# Patient Record
Sex: FEMALE | Race: OTHER | HISPANIC OR LATINO | URBAN - METROPOLITAN AREA
[De-identification: names, ages, dates, MRNs, and addresses within clinical notes are randomized per-mention and may not be internally consistent; named-entity substitution may affect disease eponyms.]

---

## 2020-07-08 ENCOUNTER — OFFICE VISIT (OUTPATIENT)
Dept: URBAN - METROPOLITAN AREA CLINIC 60 | Facility: CLINIC | Age: 65
End: 2020-07-08

## 2020-07-08 ENCOUNTER — OFFICE VISIT (OUTPATIENT)
Dept: URBAN - METROPOLITAN AREA TELEHEALTH 2 | Facility: TELEHEALTH | Age: 65
End: 2020-07-08

## 2020-09-14 ENCOUNTER — OFFICE VISIT (OUTPATIENT)
Dept: URBAN - METROPOLITAN AREA CLINIC 63 | Facility: CLINIC | Age: 65
End: 2020-09-14

## 2020-09-15 ENCOUNTER — OFFICE VISIT (OUTPATIENT)
Dept: URBAN - METROPOLITAN AREA TELEHEALTH 2 | Facility: TELEHEALTH | Age: 65
End: 2020-09-15

## 2020-10-14 ENCOUNTER — OFFICE VISIT (OUTPATIENT)
Dept: URBAN - METROPOLITAN AREA CLINIC 121 | Facility: CLINIC | Age: 65
End: 2020-10-14

## 2020-10-20 ENCOUNTER — OFFICE VISIT (OUTPATIENT)
Dept: URBAN - METROPOLITAN AREA CLINIC 60 | Facility: CLINIC | Age: 65
End: 2020-10-20

## 2020-10-29 ENCOUNTER — OFFICE VISIT (OUTPATIENT)
Dept: URBAN - METROPOLITAN AREA CLINIC 60 | Facility: CLINIC | Age: 65
End: 2020-10-29

## 2021-01-22 ENCOUNTER — OFFICE VISIT (OUTPATIENT)
Dept: URBAN - METROPOLITAN AREA SURGERY CENTER 4 | Facility: SURGERY CENTER | Age: 66
End: 2021-01-22

## 2021-01-26 LAB — PATHOLOGY (INDENTED REPORT): (no result)

## 2021-03-24 ENCOUNTER — OFFICE VISIT (OUTPATIENT)
Dept: URBAN - METROPOLITAN AREA CLINIC 63 | Facility: CLINIC | Age: 66
End: 2021-03-24

## 2021-06-11 ENCOUNTER — IMPORTED ENCOUNTER (OUTPATIENT)
Dept: URBAN - METROPOLITAN AREA CLINIC 31 | Facility: CLINIC | Age: 66
End: 2021-06-11

## 2021-06-11 PROCEDURE — 92004 COMPRE OPH EXAM NEW PT 1/>: CPT

## 2021-06-11 PROCEDURE — 92015 DETERMINE REFRACTIVE STATE: CPT

## 2021-12-02 ENCOUNTER — OFFICE VISIT (OUTPATIENT)
Dept: URBAN - METROPOLITAN AREA CLINIC 121 | Facility: CLINIC | Age: 66
End: 2021-12-02

## 2022-02-03 ENCOUNTER — OFFICE VISIT (OUTPATIENT)
Dept: URBAN - METROPOLITAN AREA SURGERY CENTER 4 | Facility: SURGERY CENTER | Age: 67
End: 2022-02-03

## 2022-02-07 LAB — PATHOLOGY (INDENTED REPORT): (no result)

## 2022-03-30 ENCOUNTER — OFFICE VISIT (OUTPATIENT)
Dept: URBAN - METROPOLITAN AREA CLINIC 63 | Facility: CLINIC | Age: 67
End: 2022-03-30

## 2022-04-01 ASSESSMENT — VISUAL ACUITY
OD_CC: 20/30-1
OS_CC: 20/25+1
OS_SC: J3
OD_SC: J10

## 2022-04-01 ASSESSMENT — TONOMETRY
OD_IOP_MMHG: 14
OS_IOP_MMHG: 15

## 2022-06-22 ENCOUNTER — OFFICE VISIT (OUTPATIENT)
Dept: URBAN - METROPOLITAN AREA CLINIC 63 | Facility: CLINIC | Age: 67
End: 2022-06-22

## 2022-07-09 ENCOUNTER — TELEPHONE ENCOUNTER (OUTPATIENT)
Dept: URBAN - METROPOLITAN AREA CLINIC 121 | Facility: CLINIC | Age: 67
End: 2022-07-09

## 2022-07-09 RX ORDER — ATORVASTATIN CALCIUM 20 MG/1
TABLET, FILM COATED ORAL
Refills: 0 | OUTPATIENT
Start: 2020-03-04 | End: 2022-03-30

## 2022-07-09 RX ORDER — METFORMIN HCL 500 MG/1
TABLET ORAL TWICE A DAY
Refills: 0 | OUTPATIENT
Start: 2020-03-04 | End: 2022-03-30

## 2022-07-09 RX ORDER — BENAZEPRIL HYDROCHLORIDE 5 MG/1
TABLET ORAL
Refills: 0 | OUTPATIENT
Start: 2020-03-04 | End: 2020-03-04

## 2022-07-09 RX ORDER — PANTOPRAZOLE SODIUM 40 MG/1
TABLET, DELAYED RELEASE ORAL
Refills: 0 | OUTPATIENT
Start: 2020-03-04 | End: 2020-09-14

## 2022-07-09 RX ORDER — SUCRALFATE 1 G/1
TWICE A DAY TABLET ORAL TWICE A DAY
Refills: 1 | OUTPATIENT
Start: 2020-07-08 | End: 2022-06-22

## 2022-07-09 RX ORDER — SUCRALFATE 1 G/1
TWICE A DAY TABLET ORAL TWICE A DAY
Refills: 0 | OUTPATIENT
Start: 2022-03-30 | End: 2022-03-31

## 2022-07-09 RX ORDER — SUCRALFATE 1 G/1
TWICE A DAY TABLET ORAL TWICE A DAY
Refills: 0 | OUTPATIENT
Start: 2022-03-31 | End: 2022-04-06

## 2022-07-09 RX ORDER — BISMUTH SUBSALICYLATE 525 MG/1
ONCE A DAY TABLET ORAL ONCE A DAY
Refills: 1 | OUTPATIENT
Start: 2020-03-04 | End: 2020-10-20

## 2022-07-10 ENCOUNTER — TELEPHONE ENCOUNTER (OUTPATIENT)
Dept: URBAN - METROPOLITAN AREA CLINIC 121 | Facility: CLINIC | Age: 67
End: 2022-07-10

## 2022-07-10 RX ORDER — BENAZEPRIL HYDROCHLORIDE 5 MG/1
1/2 TAB DAILY TABLET ORAL
Refills: 0 | Status: ACTIVE | COMMUNITY
Start: 2020-03-04

## 2022-07-10 RX ORDER — ICOSAPENT ETHYL 1000 MG/1
CAPSULE ORAL TWICE A DAY
Refills: 0 | Status: ACTIVE | COMMUNITY
Start: 2020-03-04

## 2022-07-10 RX ORDER — OMEPRAZOLE 40 MG/1
ONCE A DAY CAPSULE, DELAYED RELEASE ORAL ONCE A DAY
Refills: 2 | Status: ACTIVE | COMMUNITY
Start: 2022-03-30

## 2022-07-10 RX ORDER — ROSUVASTATIN CALCIUM 20 MG/1
TABLET, FILM COATED ORAL ONCE A DAY
Refills: 0 | Status: ACTIVE | COMMUNITY
Start: 2022-03-30

## 2022-07-10 RX ORDER — METRONIDAZOLE 500 MG/1
THREE TIMES A DAY TABLET ORAL THREE TIMES A DAY
Refills: 0 | Status: ACTIVE | COMMUNITY
Start: 2020-10-20

## 2022-07-10 RX ORDER — SUCRALFATE 1 G/1
TWICE A DAY TABLET ORAL TWICE A DAY
Refills: 0 | Status: ACTIVE | COMMUNITY
Start: 2022-04-06

## 2022-07-10 RX ORDER — CIPROFLOXACIN HYDROCHLORIDE 500 MG/1
TWICE A DAY TABLET, FILM COATED ORAL TWICE A DAY
Refills: 0 | Status: ACTIVE | COMMUNITY
Start: 2020-10-20

## 2022-07-10 RX ORDER — DENOSUMAB 60 MG/ML
ONCE EVERY 6 MONTH INJECTION SUBCUTANEOUS
Refills: 0 | Status: ACTIVE | COMMUNITY
Start: 2020-03-04

## 2022-07-10 RX ORDER — LEVOTHYROXINE SODIUM 25 UG/1
TABLET ORAL ONCE A DAY
Refills: 0 | Status: ACTIVE | COMMUNITY
Start: 2022-03-30

## 2022-09-14 ENCOUNTER — OFFICE VISIT (OUTPATIENT)
Dept: URBAN - METROPOLITAN AREA CLINIC 63 | Facility: CLINIC | Age: 67
End: 2022-09-14
Payer: COMMERCIAL

## 2022-09-14 ENCOUNTER — DASHBOARD ENCOUNTERS (OUTPATIENT)
Age: 67
End: 2022-09-14

## 2022-09-14 ENCOUNTER — WEB ENCOUNTER (OUTPATIENT)
Dept: URBAN - METROPOLITAN AREA CLINIC 63 | Facility: CLINIC | Age: 67
End: 2022-09-14

## 2022-09-14 ENCOUNTER — OFFICE VISIT (OUTPATIENT)
Dept: URBAN - METROPOLITAN AREA CLINIC 63 | Facility: CLINIC | Age: 67
End: 2022-09-14

## 2022-09-14 VITALS
WEIGHT: 118.4 LBS | DIASTOLIC BLOOD PRESSURE: 75 MMHG | SYSTOLIC BLOOD PRESSURE: 125 MMHG | BODY MASS INDEX: 23.25 KG/M2 | HEIGHT: 60 IN | OXYGEN SATURATION: 99 % | HEART RATE: 92 BPM | TEMPERATURE: 97.2 F

## 2022-09-14 DIAGNOSIS — K22.9 DISEASE OF ESOPHAGUS, UNSPECIFIED: ICD-10-CM

## 2022-09-14 DIAGNOSIS — M81.0: ICD-10-CM

## 2022-09-14 DIAGNOSIS — K57.32 DVTRCLI OF LG INT W/O PERFORATION OR ABSCESS W/O BLEEDING: ICD-10-CM

## 2022-09-14 PROCEDURE — 99213 OFFICE O/P EST LOW 20 MIN: CPT | Performed by: INTERNAL MEDICINE

## 2022-09-14 RX ORDER — OMEPRAZOLE 40 MG/1
ONCE A DAY CAPSULE, DELAYED RELEASE ORAL ONCE A DAY
Refills: 2 | Status: ACTIVE | COMMUNITY
Start: 2022-03-30

## 2022-09-14 RX ORDER — DENOSUMAB 60 MG/ML
ONCE EVERY 6 MONTH INJECTION SUBCUTANEOUS
Refills: 0 | Status: ACTIVE | COMMUNITY
Start: 2020-03-04

## 2022-09-14 RX ORDER — ICOSAPENT ETHYL 1000 MG/1
CAPSULE ORAL TWICE A DAY
Refills: 0 | Status: ACTIVE | COMMUNITY
Start: 2020-03-04

## 2022-09-14 RX ORDER — BENAZEPRIL HYDROCHLORIDE 5 MG/1
1/2 TAB DAILY TABLET ORAL
Refills: 0 | Status: ACTIVE | COMMUNITY
Start: 2020-03-04

## 2022-09-14 RX ORDER — OMEPRAZOLE 20 MG/1
1 CAPSULE 30 MINUTES BEFORE MORNING MEAL CAPSULE, DELAYED RELEASE ORAL ONCE A DAY
Qty: 30 | Refills: 1 | OUTPATIENT

## 2022-09-14 RX ORDER — LEVOTHYROXINE SODIUM 25 UG/1
TABLET ORAL ONCE A DAY
Refills: 0 | Status: ACTIVE | COMMUNITY
Start: 2022-03-30

## 2022-09-14 RX ORDER — ROSUVASTATIN CALCIUM 20 MG/1
TABLET, FILM COATED ORAL ONCE A DAY
Refills: 0 | Status: ACTIVE | COMMUNITY
Start: 2022-03-30

## 2024-05-02 ENCOUNTER — TELEPHONE ENCOUNTER (OUTPATIENT)
Dept: URBAN - METROPOLITAN AREA CLINIC 63 | Facility: CLINIC | Age: 69
End: 2024-05-02

## 2024-05-02 ENCOUNTER — LAB OUTSIDE AN ENCOUNTER (OUTPATIENT)
Dept: URBAN - METROPOLITAN AREA CLINIC 63 | Facility: CLINIC | Age: 69
End: 2024-05-02

## 2024-05-02 RX ORDER — CIPROFLOXACIN 500 MG/1
1 TABLET TABLET, FILM COATED ORAL
Qty: 28 TABLET | OUTPATIENT
Start: 2024-05-02 | End: 2024-05-16

## 2024-05-02 RX ORDER — METRONIDAZOLE 500 MG/1
1 TABLET TABLET ORAL THREE TIMES A DAY
Qty: 42 TABLET | OUTPATIENT
Start: 2024-05-02 | End: 2024-05-16

## 2024-05-31 ENCOUNTER — ERX REFILL RESPONSE (OUTPATIENT)
Dept: URBAN - METROPOLITAN AREA CLINIC 60 | Facility: CLINIC | Age: 69
End: 2024-05-31

## 2024-05-31 ENCOUNTER — OFFICE VISIT (OUTPATIENT)
Dept: URBAN - METROPOLITAN AREA CLINIC 60 | Facility: CLINIC | Age: 69
End: 2024-05-31

## 2024-05-31 VITALS
DIASTOLIC BLOOD PRESSURE: 80 MMHG | RESPIRATION RATE: 20 BRPM | OXYGEN SATURATION: 99 % | TEMPERATURE: 99.2 F | BODY MASS INDEX: 23.75 KG/M2 | WEIGHT: 121 LBS | HEART RATE: 99 BPM | SYSTOLIC BLOOD PRESSURE: 140 MMHG | HEIGHT: 60 IN

## 2024-05-31 PROBLEM — 307496006: Status: ACTIVE | Noted: 2024-05-31

## 2024-05-31 PROBLEM — 82934008: Status: ACTIVE | Noted: 2024-05-31

## 2024-05-31 PROBLEM — 8493009: Status: ACTIVE | Noted: 2024-05-31

## 2024-05-31 RX ORDER — LIDOCAINE HYDROCHLORIDE AND HYDROCORTISONE ACETATE 30; 25 MG/G; MG/G
1 APPLICATORFUL GEL RECTAL TWICE A DAY
Qty: 60 GM | Refills: 0 | OUTPATIENT
Start: 2024-05-31 | End: 2024-06-30

## 2024-05-31 RX ORDER — ROSUVASTATIN CALCIUM 20 MG/1
TABLET, FILM COATED ORAL ONCE A DAY
Refills: 0 | Status: ACTIVE | COMMUNITY
Start: 2022-03-30

## 2024-05-31 RX ORDER — OMEPRAZOLE 20 MG/1
1 CAPSULE 30 MINUTES BEFORE MORNING MEAL CAPSULE, DELAYED RELEASE ORAL ONCE A DAY
Qty: 30 | Refills: 0 | OUTPATIENT

## 2024-05-31 RX ORDER — LIDOCAINE HYDROCHLORIDE AND HYDROCORTISONE ACETATE 30; 25 MG/G; MG/G
1 APPLICATORFUL GEL RECTAL TWICE A DAY
Qty: 60 GM | OUTPATIENT
Start: 2024-05-31 | End: 2024-06-30

## 2024-05-31 RX ORDER — FAMOTIDINE 20 MG/1
1 TABLET AT BEDTIME AS NEEDED TABLET, FILM COATED ORAL ONCE A DAY
Qty: 30 | OUTPATIENT

## 2024-05-31 RX ORDER — DENOSUMAB 60 MG/ML
ONCE EVERY 6 MONTH INJECTION SUBCUTANEOUS
Refills: 0 | Status: ACTIVE | COMMUNITY
Start: 2020-03-04

## 2024-05-31 RX ORDER — FAMOTIDINE 20 MG/1
1 TABLET AT BEDTIME AS NEEDED TABLET, FILM COATED ORAL ONCE A DAY
Qty: 30 | Refills: 0 | OUTPATIENT

## 2024-05-31 RX ORDER — LINACLOTIDE 145 UG/1
1 CAPSULE AT LEAST 30 MINUTES BEFORE THE FIRST MEAL OF THE DAY ON AN EMPTY STOMACH CAPSULE, GELATIN COATED ORAL ONCE A DAY
Qty: 30 | OUTPATIENT
Start: 2024-05-31 | End: 2024-06-30

## 2024-05-31 RX ORDER — OMEPRAZOLE 20 MG/1
1 CAPSULE 30 MINUTES BEFORE MORNING MEAL CAPSULE, DELAYED RELEASE ORAL ONCE A DAY
Qty: 30 | OUTPATIENT
Start: 2024-05-31

## 2024-05-31 RX ORDER — LEVOTHYROXINE SODIUM 25 UG/1
TABLET ORAL ONCE A DAY
Refills: 0 | Status: ACTIVE | COMMUNITY
Start: 2022-03-30

## 2024-05-31 RX ORDER — BENAZEPRIL HYDROCHLORIDE 5 MG/1
1/2 TAB DAILY TABLET ORAL
Refills: 0 | Status: ACTIVE | COMMUNITY
Start: 2020-03-04

## 2024-05-31 RX ORDER — OMEPRAZOLE 40 MG/1
ONCE A DAY CAPSULE, DELAYED RELEASE ORAL ONCE A DAY
Refills: 2 | Status: ACTIVE | COMMUNITY
Start: 2022-03-30

## 2024-05-31 RX ORDER — OMEPRAZOLE 20 MG/1
1 CAPSULE 30 MINUTES BEFORE MORNING MEAL CAPSULE, DELAYED RELEASE ORAL ONCE A DAY
Qty: 30 | OUTPATIENT

## 2024-05-31 RX ORDER — FAMOTIDINE 20 MG/1
1 TABLET AT BEDTIME AS NEEDED TABLET, FILM COATED ORAL ONCE A DAY
Qty: 30 | OUTPATIENT
Start: 2024-05-31

## 2024-06-03 ENCOUNTER — ERX REFILL RESPONSE (OUTPATIENT)
Dept: URBAN - METROPOLITAN AREA CLINIC 60 | Facility: CLINIC | Age: 69
End: 2024-06-03

## 2024-06-03 RX ORDER — OMEPRAZOLE 20 MG/1
1 CAPSULE 30 MINUTES BEFORE MORNING MEAL CAPSULE, DELAYED RELEASE ORAL ONCE A DAY
Qty: 30 | Refills: 0 | OUTPATIENT

## 2024-06-03 RX ORDER — FAMOTIDINE 20 MG/1
1 TABLET AT BEDTIME AS NEEDED TABLET, FILM COATED ORAL ONCE A DAY
Qty: 30 | Refills: 0 | OUTPATIENT

## 2024-06-07 ENCOUNTER — LAB OUTSIDE AN ENCOUNTER (OUTPATIENT)
Dept: URBAN - METROPOLITAN AREA CLINIC 60 | Facility: CLINIC | Age: 69
End: 2024-06-07

## 2024-08-09 ENCOUNTER — OFFICE VISIT (OUTPATIENT)
Dept: URBAN - METROPOLITAN AREA SURGERY CENTER 4 | Facility: SURGERY CENTER | Age: 69
End: 2024-08-09
Payer: MEDICARE

## 2024-08-09 ENCOUNTER — CLAIMS CREATED FROM THE CLAIM WINDOW (OUTPATIENT)
Dept: URBAN - METROPOLITAN AREA CLINIC 4 | Facility: CLINIC | Age: 69
End: 2024-08-09
Payer: MEDICARE

## 2024-08-09 DIAGNOSIS — K63.5 POLYP OF COLON: ICD-10-CM

## 2024-08-09 DIAGNOSIS — Z86.010 PERSONAL HISTORY OF COLONIC POLYPS: ICD-10-CM

## 2024-08-09 DIAGNOSIS — K64.1 SECOND DEGREE HEMORRHOIDS: ICD-10-CM

## 2024-08-09 DIAGNOSIS — K63.5 POLYP OF SIGMOID COLON, UNSPECIFIED TYPE: ICD-10-CM

## 2024-08-09 DIAGNOSIS — K57.30 DIVERTICULOSIS OF LARGE INTESTINE WITHOUT PERFORATION OR ABSCESS WITHOUT BLEEDING: ICD-10-CM

## 2024-08-09 DIAGNOSIS — D12.5 ADENOMATOUS POLYP OF SIGMOID COLON: ICD-10-CM

## 2024-08-09 DIAGNOSIS — Z86.010 COLON CANCER SCREENING (HIGH): ICD-10-CM

## 2024-08-09 DIAGNOSIS — Z12.11 COLON CANCER SCREENING: ICD-10-CM

## 2024-08-09 PROCEDURE — 45380 COLONOSCOPY AND BIOPSY: CPT | Performed by: INTERNAL MEDICINE

## 2024-08-09 PROCEDURE — 00811 ANES LWR INTST NDSC NOS: CPT | Performed by: NURSE ANESTHETIST, CERTIFIED REGISTERED

## 2024-08-09 PROCEDURE — 88305 TISSUE EXAM BY PATHOLOGIST: CPT | Performed by: PATHOLOGY

## 2024-08-09 RX ORDER — FAMOTIDINE 20 MG/1
1 TABLET AT BEDTIME AS NEEDED TABLET, FILM COATED ORAL ONCE A DAY
Qty: 30 | Refills: 0 | Status: ACTIVE | COMMUNITY

## 2024-08-09 RX ORDER — ROSUVASTATIN CALCIUM 20 MG/1
TABLET, FILM COATED ORAL ONCE A DAY
Refills: 0 | Status: ACTIVE | COMMUNITY
Start: 2022-03-30

## 2024-08-09 RX ORDER — OMEPRAZOLE 20 MG/1
1 CAPSULE 30 MINUTES BEFORE MORNING MEAL CAPSULE, DELAYED RELEASE ORAL ONCE A DAY
Qty: 30 | Refills: 0 | Status: ACTIVE | COMMUNITY

## 2024-08-09 RX ORDER — LEVOTHYROXINE SODIUM 25 UG/1
TABLET ORAL ONCE A DAY
Refills: 0 | Status: ACTIVE | COMMUNITY
Start: 2022-03-30

## 2024-08-09 RX ORDER — DENOSUMAB 60 MG/ML
ONCE EVERY 6 MONTH INJECTION SUBCUTANEOUS
Refills: 0 | Status: ACTIVE | COMMUNITY
Start: 2020-03-04

## 2024-08-09 RX ORDER — BENAZEPRIL HYDROCHLORIDE 5 MG/1
1/2 TAB DAILY TABLET ORAL
Refills: 0 | Status: ACTIVE | COMMUNITY
Start: 2020-03-04

## 2024-08-09 RX ORDER — OMEPRAZOLE 40 MG/1
ONCE A DAY CAPSULE, DELAYED RELEASE ORAL ONCE A DAY
Refills: 2 | Status: ACTIVE | COMMUNITY
Start: 2022-03-30

## 2024-08-20 ENCOUNTER — OFFICE VISIT (OUTPATIENT)
Dept: URBAN - METROPOLITAN AREA CLINIC 60 | Facility: CLINIC | Age: 69
End: 2024-08-20
Payer: MEDICARE

## 2024-08-20 ENCOUNTER — OFFICE VISIT (OUTPATIENT)
Dept: URBAN - METROPOLITAN AREA SURGERY CENTER 4 | Facility: SURGERY CENTER | Age: 69
End: 2024-08-20

## 2024-08-20 VITALS
HEIGHT: 60 IN | DIASTOLIC BLOOD PRESSURE: 80 MMHG | HEART RATE: 111 BPM | BODY MASS INDEX: 22.97 KG/M2 | WEIGHT: 117 LBS | SYSTOLIC BLOOD PRESSURE: 138 MMHG | OXYGEN SATURATION: 96 % | TEMPERATURE: 98.6 F | RESPIRATION RATE: 20 BRPM

## 2024-08-20 DIAGNOSIS — K57.30 DIVERTICULAR DISEASE OF COLON: ICD-10-CM

## 2024-08-20 DIAGNOSIS — K64.1 GRADE II HEMORRHOIDS: ICD-10-CM

## 2024-08-20 DIAGNOSIS — K63.5 HYPERPLASTIC COLONIC POLYP, UNSPECIFIED PART OF COLON: ICD-10-CM

## 2024-08-20 DIAGNOSIS — K59.04 CHRONIC IDIOPATHIC CONSTIPATION: ICD-10-CM

## 2024-08-20 PROBLEM — 733657002: Status: ACTIVE | Noted: 2024-08-20

## 2024-08-20 PROCEDURE — 99214 OFFICE O/P EST MOD 30 MIN: CPT | Performed by: NURSE PRACTITIONER

## 2024-08-20 RX ORDER — DENOSUMAB 60 MG/ML
ONCE EVERY 6 MONTH INJECTION SUBCUTANEOUS
Refills: 0 | Status: ACTIVE | COMMUNITY
Start: 2020-03-04

## 2024-08-20 RX ORDER — LIDOCAINE HYDROCHLORIDE AND HYDROCORTISONE ACETATE 30; 25 MG/G; MG/G
1 APPLICATORFUL GEL RECTAL TWICE A DAY
Qty: 60 GM | Refills: 3 | OUTPATIENT
Start: 2024-08-20 | End: 2024-12-17

## 2024-08-20 RX ORDER — BENAZEPRIL HYDROCHLORIDE 5 MG/1
1/2 TAB DAILY TABLET ORAL
Refills: 0 | Status: ACTIVE | COMMUNITY
Start: 2020-03-04

## 2024-08-20 RX ORDER — LINACLOTIDE 145 UG/1
1 CAPSULE AT LEAST 30 MINUTES BEFORE THE FIRST MEAL OF THE DAY ON AN EMPTY STOMACH CAPSULE, GELATIN COATED ORAL ONCE A DAY
Qty: 90 | Refills: 3 | OUTPATIENT
Start: 2024-08-20 | End: 2025-08-15

## 2024-08-20 RX ORDER — OMEPRAZOLE 20 MG/1
1 CAPSULE 30 MINUTES BEFORE MORNING MEAL CAPSULE, DELAYED RELEASE ORAL ONCE A DAY
Qty: 30 | Refills: 0 | Status: ACTIVE | COMMUNITY

## 2024-08-20 RX ORDER — LEVOTHYROXINE SODIUM 25 UG/1
TABLET ORAL ONCE A DAY
Refills: 0 | Status: ACTIVE | COMMUNITY
Start: 2022-03-30

## 2024-08-20 RX ORDER — ROSUVASTATIN CALCIUM 20 MG/1
TABLET, FILM COATED ORAL ONCE A DAY
Refills: 0 | Status: ACTIVE | COMMUNITY
Start: 2022-03-30

## 2024-08-20 NOTE — HPI-HPI
Patient had an endoscopy and colonoscopy in  by Dr. Mark.  There was evidence of diverticulosis otherwise normal colon was recommended to repeat a colonoscopy in 10 years.  Since patient had 2 episodes of diverticulitis that resolved with antibiotics.  No evidence of further complication.  Patient now comes to visit for discussion of her symptoms.  Will consider use of probiotics.  No plan of endoscopic procedure at this moment.  Will need records from Dr. Mark. Patient takes PPI daily does have family h/o gastric cancer and a brother with pancreatic cancer. Patient with as per patient had normal labs. Will consider EGD.Ultrasound done recently is unremarkable normal liver normal gallbladder normal pancreas spleen and kidneys.  : Patient persist with dyspepsia and takes PPI once a day doing well but not able  to wean off of PPI will add Sucralfate BID and will try to wean off PPI. And will consider EGD in the next visit in 2 months.  ; patient feels better with occasional heartburn, will need to control better her glucose, patient stop cholesterol and glucose treatment. Need to continue a regimen for her diabetes patient wean her self off PPI and sucralfate. (Brother and sister  of cirrhosis) Will need to check labs and ultrasound, and will consider EGD if symptoms worsen or recur. Will ask results of recent labs. Discussion [Use for free text].   10/20 Patient here today complaining of left lower quadrant abdominal pain. The pain started 2 days ago.  She denies any fever, nausea, vomiting, diarrhea, rectal bleeding.  Last colonoscopy done 7 years ago shows evidence of sigmoid colon diverticular disease.  At physical exam patient with mild guarding on deep palpation of the left lower quadrant abdomen. Plan: Patient we had STAT abdomen pelvis CT scan with contrast. Antibiotic treatment upon CT results today. N.p.o. for now. Addendum: CT scan showed evidence of uncomplicated diverticulitis. Patient will do treatment with Metronidazole and Cipro for 10 days.  Clear liquid diet for two days. 10/20 Patient here today in better general state, she is doing treatment for diverticulitis above described.  Still having abdominal discomfort over the left lower quadrant, denies any fever, nausea, vomiting, diarrhea, rectal bleeding, or constipation.  Patient mentioned that she has a brother and sister who  with liver cirrhosis and pancreatic problems.  Patient has done an ultrasound on 2020 that shows normal pancreas and liver.  Also, recent CT scan abdomen shows evidence of probably cyst or hemangioma of the liver with no stigmata of malignancy, gallbladder sediment. Plan: Patient will have colonoscopy in 8-week. Continue antibiotic treatment. With CT scan abdomen and a year to follow-up for liver cyst/hemangioma. 3/21: Patient 2020 was seen with abdominal pain, CT scan of abdomen and pelvis show mild acute uncomplicated diverticulitis involving the distal descending colon.  And a tiny sliding hiatal hernia.  Patient was treated with antibiotic symptoms improved.  Had been  a colonoscopy with diverticulosis in the sigmoid colon and ascending colon with no evidence of inflammation or bleeding.  Internal hemorrhoids and colonic polyps.  5 mm polyp in the transverse colon and 2 polyps in the ileocecal valve all of them removed with cold biopsy forceps.  Pathology report shows that the polyps were tubular adenomas at the ileocecal valve and hyperplastic polyp in the colon transfer.  Was recommended to repeat a colonoscopy in 5 years by protocol. Patient with occasional constipation, will try daily MiraLAX 17 gr, for now and increase water and fiber in her diet. Will follow as needed basis.  3/22: Patient with GERD and epigastric pain related to heavy meals. Patient was in [place] and start having abdominal pain. Heartburn, will plan trial with PPI and Carafate. Denies diarrhea, rectal bleeding, or other health issues. Patient had upper endoscopy in 2022 with evidence of a small hiatal hernia and distal esophagitis, biopsies show no evidence of reflux negative for Monet's esophagus, negative for dysplasia.  Patient also have gastritis negative for H. pylori and normal duodenum.  With this finding we will start treatment with PPI and Carafate, will follow in 3 months. Will do ultrasound of the abdomen. : Patient had ultrasound with cystic lesion of the kidney, will defer to PCP follow up and evaluation. Pancreas no completely evaluated, no other pathology seen. Patient since last week with LLQ  similar with previous episode of diverticulitis. Today there is no abdominal pain. Will monitor her clinical course, patient feels better but persist with constipation, will start MiraLAX and will call back if symptoms recur.   : Patient with MiraLAX twice a day, drinking water, with regular BM,  denies further LLQ tenderness.  Patient doing well, will try to wean her off the PPI. Will follow as needed basis, will follow in 6 months. Patient here today in good general state.  Patient states she is finishing treatment for diverticulitis with metronidazole and ciprofloxacin 4 days ago.  States that she had a flare of her gastritis, started while she was doing this treatment.  Patient also complaining of constipation and rectal pain.  Physical exam there is a thrombosed hemorrhoid with teeny excoriation at 10:00 o'clock.  Patient will resume diet and treatment for gastritis and reflux with famotidine 20 mg at bedtime and 20 mg of omeprazole in a.m., diet.  Will avoid constipation start on Linzess 172 micrograms daily since over-the-counter laxatives are not working for her anymore.  Topical treatment for hemorrhoids and colonoscopy inn 8 weeks.  Patient colonoscopy shows evidence of a 5 mm hyperplastic polyp into the sigmoid colon, mild diverticular disease of the entire examined colon, and internal hemorrhoids.  Today she is in good general states, her next colonoscopy will be in 5 years.  Patient states still having constipation and hemorrhoids irritation Linzess is working good for her, but she is lacking of her medication.  She will increase fiber and fluid intake, exercises continue with Linzess 172 mcg daily topical treatment for hemorrhoids.

## 2024-08-23 ENCOUNTER — OFFICE VISIT (OUTPATIENT)
Dept: URBAN - METROPOLITAN AREA CLINIC 60 | Facility: CLINIC | Age: 69
End: 2024-08-23

## 2024-11-20 ENCOUNTER — OFFICE VISIT (OUTPATIENT)
Dept: URBAN - METROPOLITAN AREA CLINIC 60 | Facility: CLINIC | Age: 69
End: 2024-11-20

## 2024-11-20 RX ORDER — DENOSUMAB 60 MG/ML
ONCE EVERY 6 MONTH INJECTION SUBCUTANEOUS
Refills: 0 | Status: ACTIVE | COMMUNITY
Start: 2020-03-04

## 2024-11-20 RX ORDER — OMEPRAZOLE 20 MG/1
1 CAPSULE 30 MINUTES BEFORE MORNING MEAL CAPSULE, DELAYED RELEASE ORAL ONCE A DAY
Qty: 30 | Refills: 0 | Status: ACTIVE | COMMUNITY

## 2024-11-20 RX ORDER — FAMOTIDINE 20 MG/1
1 TABLET AT BEDTIME AS NEEDED TABLET, FILM COATED ORAL ONCE A DAY
Qty: 30 | Refills: 0 | Status: ACTIVE | COMMUNITY

## 2024-11-20 RX ORDER — BENAZEPRIL HYDROCHLORIDE 5 MG/1
1/2 TAB DAILY TABLET ORAL
Refills: 0 | Status: ACTIVE | COMMUNITY
Start: 2020-03-04

## 2024-11-20 RX ORDER — ROSUVASTATIN CALCIUM 20 MG/1
TABLET, FILM COATED ORAL ONCE A DAY
Refills: 0 | Status: ACTIVE | COMMUNITY
Start: 2022-03-30

## 2024-11-20 RX ORDER — LINACLOTIDE 145 UG/1
1 CAPSULE AT LEAST 30 MINUTES BEFORE THE FIRST MEAL OF THE DAY ON AN EMPTY STOMACH CAPSULE, GELATIN COATED ORAL ONCE A DAY
Qty: 90 | Refills: 3 | Status: ACTIVE | COMMUNITY
Start: 2024-08-20 | End: 2025-08-15

## 2024-11-20 RX ORDER — LEVOTHYROXINE SODIUM 25 UG/1
TABLET ORAL ONCE A DAY
Refills: 0 | Status: ACTIVE | COMMUNITY
Start: 2022-03-30

## 2024-11-20 RX ORDER — LIDOCAINE HYDROCHLORIDE AND HYDROCORTISONE ACETATE 30; 25 MG/G; MG/G
1 APPLICATORFUL GEL RECTAL TWICE A DAY
Qty: 60 GM | Refills: 3 | Status: ACTIVE | COMMUNITY
Start: 2024-08-20 | End: 2024-12-17

## 2024-11-20 RX ORDER — OMEPRAZOLE 40 MG/1
ONCE A DAY CAPSULE, DELAYED RELEASE ORAL ONCE A DAY
Refills: 2 | Status: ACTIVE | COMMUNITY
Start: 2022-03-30

## 2024-12-12 ENCOUNTER — OFFICE VISIT (OUTPATIENT)
Dept: URBAN - METROPOLITAN AREA CLINIC 60 | Facility: CLINIC | Age: 69
End: 2024-12-12
Payer: COMMERCIAL

## 2024-12-12 VITALS
HEART RATE: 77 BPM | WEIGHT: 116 LBS | RESPIRATION RATE: 20 BRPM | SYSTOLIC BLOOD PRESSURE: 120 MMHG | OXYGEN SATURATION: 98 % | HEIGHT: 60 IN | TEMPERATURE: 97.9 F | DIASTOLIC BLOOD PRESSURE: 70 MMHG | BODY MASS INDEX: 22.78 KG/M2

## 2024-12-12 DIAGNOSIS — K59.04 CHRONIC IDIOPATHIC CONSTIPATION: ICD-10-CM

## 2024-12-12 DIAGNOSIS — K57.30 DIVERTICULAR DISEASE OF COLON: ICD-10-CM

## 2024-12-12 DIAGNOSIS — K64.1 GRADE II HEMORRHOIDS: ICD-10-CM

## 2024-12-12 DIAGNOSIS — K63.5 BENIGN COLON POLYP: ICD-10-CM

## 2024-12-12 PROCEDURE — 99214 OFFICE O/P EST MOD 30 MIN: CPT | Performed by: NURSE PRACTITIONER

## 2024-12-12 RX ORDER — LINACLOTIDE 145 UG/1
1 CAPSULE AT LEAST 30 MINUTES BEFORE THE FIRST MEAL OF THE DAY ON AN EMPTY STOMACH CAPSULE, GELATIN COATED ORAL ONCE A DAY
Qty: 90 | Refills: 3 | Status: ACTIVE | COMMUNITY
Start: 2024-08-20 | End: 2025-08-15

## 2024-12-12 RX ORDER — LIDOCAINE HYDROCHLORIDE AND HYDROCORTISONE ACETATE 30; 25 MG/G; MG/G
1 APPLICATORFUL GEL RECTAL TWICE A DAY
Qty: 60 GM | Refills: 3 | Status: ACTIVE | COMMUNITY
Start: 2024-08-20 | End: 2024-12-17

## 2024-12-12 RX ORDER — LIDOCAINE HYDROCHLORIDE AND HYDROCORTISONE ACETATE 30; 25 MG/G; MG/G
1 APPLICATORFUL GEL RECTAL TWICE A DAY
Qty: 60 GM | Refills: 3 | OUTPATIENT

## 2024-12-12 RX ORDER — DENOSUMAB 60 MG/ML
ONCE EVERY 6 MONTH INJECTION SUBCUTANEOUS
Refills: 0 | Status: ACTIVE | COMMUNITY
Start: 2020-03-04

## 2024-12-12 RX ORDER — LEVOTHYROXINE SODIUM 25 UG/1
TABLET ORAL ONCE A DAY
Refills: 0 | Status: ACTIVE | COMMUNITY
Start: 2022-03-30

## 2024-12-12 RX ORDER — LINACLOTIDE 72 UG/1
1 CAPSULE AT LEAST 30 MINUTES BEFORE THE FIRST MEAL OF THE DAY ON AN EMPTY STOMACH CAPSULE, GELATIN COATED ORAL ONCE A DAY
Qty: 90 | Refills: 3 | OUTPATIENT

## 2024-12-12 RX ORDER — BENAZEPRIL HYDROCHLORIDE 5 MG/1
1/2 TAB DAILY TABLET ORAL
Refills: 0 | Status: ACTIVE | COMMUNITY
Start: 2020-03-04

## 2024-12-12 RX ORDER — ROSUVASTATIN CALCIUM 20 MG/1
TABLET, FILM COATED ORAL ONCE A DAY
Refills: 0 | Status: ACTIVE | COMMUNITY
Start: 2022-03-30

## 2024-12-12 NOTE — HPI-HPI
Patient had an endoscopy and colonoscopy in  by Dr. Mark.  There was evidence of diverticulosis otherwise normal colon was recommended to repeat a colonoscopy in 10 years.  Since patient had 2 episodes of diverticulitis that resolved with antibiotics.  No evidence of further complication.  Patient now comes to visit for discussion of her symptoms.  Will consider use of probiotics.  No plan of endoscopic procedure at this moment.  Will need records from Dr. Mark. Patient takes PPI daily does have family h/o gastric cancer and a brother with pancreatic cancer. Patient with as per patient had normal labs. Will consider EGD.Ultrasound done recently is unremarkable normal liver normal gallbladder normal pancreas spleen and kidneys.  : Patient persist with dyspepsia and takes PPI once a day doing well but not able  to wean off of PPI will add Sucralfate BID and will try to wean off PPI. And will consider EGD in the next visit in 2 months.  ; patient feels better with occasional heartburn, will need to control better her glucose, patient stop cholesterol and glucose treatment. Need to continue a regimen for her diabetes patient wean her self off PPI and sucralfate. (Brother and sister  of cirrhosis) Will need to check labs and ultrasound, and will consider EGD if symptoms worsen or recur. Will ask results of recent labs. Discussion [Use for free text].   10/20 Patient here today complaining of left lower quadrant abdominal pain. The pain started 2 days ago.  She denies any fever, nausea, vomiting, diarrhea, rectal bleeding.  Last colonoscopy done 7 years ago shows evidence of sigmoid colon diverticular disease.  At physical exam patient with mild guarding on deep palpation of the left lower quadrant abdomen. Plan: Patient we had STAT abdomen pelvis CT scan with contrast. Antibiotic treatment upon CT results today. N.p.o. for now. Addendum: CT scan showed evidence of uncomplicated diverticulitis. Patient will do treatment with Metronidazole and Cipro for 10 days.  Clear liquid diet for two days. 10/20 Patient here today in better general state, she is doing treatment for diverticulitis above described.  Still having abdominal discomfort over the left lower quadrant, denies any fever, nausea, vomiting, diarrhea, rectal bleeding, or constipation.  Patient mentioned that she has a brother and sister who  with liver cirrhosis and pancreatic problems.  Patient has done an ultrasound on 2020 that shows normal pancreas and liver.  Also, recent CT scan abdomen shows evidence of probably cyst or hemangioma of the liver with no stigmata of malignancy, gallbladder sediment. Plan: Patient will have colonoscopy in 8-week. Continue antibiotic treatment. With CT scan abdomen and a year to follow-up for liver cyst/hemangioma. 3/21: Patient 2020 was seen with abdominal pain, CT scan of abdomen and pelvis show mild acute uncomplicated diverticulitis involving the distal descending colon.  And a tiny sliding hiatal hernia.  Patient was treated with antibiotic symptoms improved.  Had been  a colonoscopy with diverticulosis in the sigmoid colon and ascending colon with no evidence of inflammation or bleeding.  Internal hemorrhoids and colonic polyps.  5 mm polyp in the transverse colon and 2 polyps in the ileocecal valve all of them removed with cold biopsy forceps.  Pathology report shows that the polyps were tubular adenomas at the ileocecal valve and hyperplastic polyp in the colon transfer.  Was recommended to repeat a colonoscopy in 5 years by protocol. Patient with occasional constipation, will try daily MiraLAX 17 gr, for now and increase water and fiber in her diet. Will follow as needed basis.  3/22: Patient with GERD and epigastric pain related to heavy meals. Patient was in [place] and start having abdominal pain. Heartburn, will plan trial with PPI and Carafate. Denies diarrhea, rectal bleeding, or other health issues. Patient had upper endoscopy in 2022 with evidence of a small hiatal hernia and distal esophagitis, biopsies show no evidence of reflux negative for Monet's esophagus, negative for dysplasia.  Patient also have gastritis negative for H. pylori and normal duodenum.  With this finding we will start treatment with PPI and Carafate, will follow in 3 months. Will do ultrasound of the abdomen. : Patient had ultrasound with cystic lesion of the kidney, will defer to PCP follow up and evaluation. Pancreas no completely evaluated, no other pathology seen. Patient since last week with LLQ  similar with previous episode of diverticulitis. Today there is no abdominal pain. Will monitor her clinical course, patient feels better but persist with constipation, will start MiraLAX and will call back if symptoms recur.   : Patient with MiraLAX twice a day, drinking water, with regular BM,  denies further LLQ tenderness.  Patient doing well, will try to wean her off the PPI. Will follow as needed basis, will follow in 6 months. Patient here today in good general state.  Patient states she is finishing treatment for diverticulitis with metronidazole and ciprofloxacin 4 days ago.  States that she had a flare of her gastritis, started while she was doing this treatment.  Patient also complaining of constipation and rectal pain.  Physical exam there is a thrombosed hemorrhoid with teeny excoriation at 10:00 o'clock.  Patient will resume diet and treatment for gastritis and reflux with famotidine 20 mg at bedtime and 20 mg of omeprazole in a.m., diet.  Will avoid constipation start on Linzess 172 micrograms daily since over-the-counter laxatives are not working for her anymore.  Topical treatment for hemorrhoids and colonoscopy inn 8 weeks.  Patient colonoscopy shows evidence of a 5 mm hyperplastic polyp into the sigmoid colon, mild diverticular disease of the entire examined colon, and internal hemorrhoids.  Today she is in good general states, her next colonoscopy will be in 5 years.  Patient states still having constipation and hemorrhoids irritation Linzess is working good for her, but she is lacking of her medication.  She will increase fiber and fluid intake, exercises continue with Linzess 172 mcg daily topical treatment for hemorrhoids.   Patient here today in good general state.  Patient states having hemorrhoids irritation Linzess is giving her too much diarrhea.  We are going to decrease dose of Linzess to 72 mcg daily.  Patient will avoid constipation topical treatment for hemorrhoids increase fiber and fluid intake increase exercises.

## 2025-01-23 ENCOUNTER — OFFICE VISIT (OUTPATIENT)
Dept: URBAN - METROPOLITAN AREA CLINIC 60 | Facility: CLINIC | Age: 70
End: 2025-01-23

## 2025-02-12 ENCOUNTER — WEB ENCOUNTER (OUTPATIENT)
Dept: URBAN - METROPOLITAN AREA CLINIC 63 | Facility: CLINIC | Age: 70
End: 2025-02-12

## 2025-02-20 ENCOUNTER — OFFICE VISIT (OUTPATIENT)
Dept: URBAN - METROPOLITAN AREA CLINIC 60 | Facility: CLINIC | Age: 70
End: 2025-02-20
Payer: COMMERCIAL

## 2025-02-20 VITALS
BODY MASS INDEX: 23.75 KG/M2 | SYSTOLIC BLOOD PRESSURE: 120 MMHG | HEIGHT: 60 IN | TEMPERATURE: 97.9 F | DIASTOLIC BLOOD PRESSURE: 78 MMHG | OXYGEN SATURATION: 99 % | RESPIRATION RATE: 20 BRPM | HEART RATE: 96 BPM | WEIGHT: 121 LBS

## 2025-02-20 DIAGNOSIS — K64.1 GRADE II HEMORRHOIDS: ICD-10-CM

## 2025-02-20 DIAGNOSIS — K63.5 BENIGN COLON POLYP: ICD-10-CM

## 2025-02-20 DIAGNOSIS — K59.04 CHRONIC IDIOPATHIC CONSTIPATION: ICD-10-CM

## 2025-02-20 DIAGNOSIS — K57.30 DIVERTICULAR DISEASE OF COLON: ICD-10-CM

## 2025-02-20 PROCEDURE — 99214 OFFICE O/P EST MOD 30 MIN: CPT | Performed by: NURSE PRACTITIONER

## 2025-02-20 RX ORDER — BENAZEPRIL HYDROCHLORIDE 5 MG/1
1/2 TAB DAILY TABLET ORAL
Refills: 0 | Status: ACTIVE | COMMUNITY
Start: 2020-03-04

## 2025-02-20 RX ORDER — DENOSUMAB 60 MG/ML
ONCE EVERY 6 MONTH INJECTION SUBCUTANEOUS
Refills: 0 | Status: ACTIVE | COMMUNITY
Start: 2020-03-04

## 2025-02-20 RX ORDER — ROSUVASTATIN CALCIUM 20 MG/1
TABLET, FILM COATED ORAL ONCE A DAY
Refills: 0 | Status: ACTIVE | COMMUNITY
Start: 2022-03-30

## 2025-02-20 RX ORDER — LIDOCAINE HYDROCHLORIDE AND HYDROCORTISONE ACETATE 30; 25 MG/G; MG/G
1 APPLICATORFUL GEL RECTAL TWICE A DAY
Qty: 60 GM | Refills: 3 | Status: ACTIVE | COMMUNITY

## 2025-02-20 RX ORDER — LEVOTHYROXINE SODIUM 25 UG/1
TABLET ORAL ONCE A DAY
Refills: 0 | Status: ACTIVE | COMMUNITY
Start: 2022-03-30

## 2025-02-20 RX ORDER — LINACLOTIDE 72 UG/1
1 CAPSULE AT LEAST 30 MINUTES BEFORE THE FIRST MEAL OF THE DAY ON AN EMPTY STOMACH CAPSULE, GELATIN COATED ORAL ONCE A DAY
Qty: 90 | Refills: 3 | Status: ACTIVE | COMMUNITY

## 2025-02-20 RX ORDER — LINACLOTIDE 72 UG/1
1 CAPSULE AT LEAST 30 MINUTES BEFORE THE FIRST MEAL OF THE DAY ON AN EMPTY STOMACH CAPSULE, GELATIN COATED ORAL ONCE A DAY
Qty: 90 | Refills: 3 | OUTPATIENT

## 2025-02-20 RX ORDER — METRONIDAZOLE 500 MG/1
1 TABLET TABLET ORAL THREE TIMES A DAY
Status: ACTIVE | COMMUNITY

## 2025-02-20 RX ORDER — LIDOCAINE HYDROCHLORIDE AND HYDROCORTISONE ACETATE 30; 25 MG/G; MG/G
1 APPLICATORFUL GEL RECTAL TWICE A DAY
Qty: 60 GM | Refills: 3 | OUTPATIENT

## 2025-02-20 NOTE — HPI-HPI
Patient had an endoscopy and colonoscopy in  by Dr. Mark.  There was evidence of diverticulosis otherwise normal colon was recommended to repeat a colonoscopy in 10 years.  Since patient had 2 episodes of diverticulitis that resolved with antibiotics.  No evidence of further complication.  Patient now comes to visit for discussion of her symptoms.  Will consider use of probiotics.  No plan of endoscopic procedure at this moment.  Will need records from Dr. Mark. Patient takes PPI daily does have family h/o gastric cancer and a brother with pancreatic cancer. Patient with as per patient had normal labs. Will consider EGD.Ultrasound done recently is unremarkable normal liver normal gallbladder normal pancreas spleen and kidneys.  : Patient persist with dyspepsia and takes PPI once a day doing well but not able  to wean off of PPI will add Sucralfate BID and will try to wean off PPI. And will consider EGD in the next visit in 2 months.  ; patient feels better with occasional heartburn, will need to control better her glucose, patient stop cholesterol and glucose treatment. Need to continue a regimen for her diabetes patient wean her self off PPI and sucralfate. (Brother and sister  of cirrhosis) Will need to check labs and ultrasound, and will consider EGD if symptoms worsen or recur. Will ask results of recent labs. Discussion [Use for free text].   10/20 Patient here today complaining of left lower quadrant abdominal pain. The pain started 2 days ago.  She denies any fever, nausea, vomiting, diarrhea, rectal bleeding.  Last colonoscopy done 7 years ago shows evidence of sigmoid colon diverticular disease.  At physical exam patient with mild guarding on deep palpation of the left lower quadrant abdomen. Plan: Patient we had STAT abdomen pelvis CT scan with contrast. Antibiotic treatment upon CT results today. N.p.o. for now. Addendum: CT scan showed evidence of uncomplicated diverticulitis. Patient will do treatment with Metronidazole and Cipro for 10 days.  Clear liquid diet for two days. 10/20 Patient here today in better general state, she is doing treatment for diverticulitis above described.  Still having abdominal discomfort over the left lower quadrant, denies any fever, nausea, vomiting, diarrhea, rectal bleeding, or constipation.  Patient mentioned that she has a brother and sister who  with liver cirrhosis and pancreatic problems.  Patient has done an ultrasound on 2020 that shows normal pancreas and liver.  Also, recent CT scan abdomen shows evidence of probably cyst or hemangioma of the liver with no stigmata of malignancy, gallbladder sediment. Plan: Patient will have colonoscopy in 8-week. Continue antibiotic treatment. With CT scan abdomen and a year to follow-up for liver cyst/hemangioma. 3/21: Patient 2020 was seen with abdominal pain, CT scan of abdomen and pelvis show mild acute uncomplicated diverticulitis involving the distal descending colon.  And a tiny sliding hiatal hernia.  Patient was treated with antibiotic symptoms improved.  Had been  a colonoscopy with diverticulosis in the sigmoid colon and ascending colon with no evidence of inflammation or bleeding.  Internal hemorrhoids and colonic polyps.  5 mm polyp in the transverse colon and 2 polyps in the ileocecal valve all of them removed with cold biopsy forceps.  Pathology report shows that the polyps were tubular adenomas at the ileocecal valve and hyperplastic polyp in the colon transfer.  Was recommended to repeat a colonoscopy in 5 years by protocol. Patient with occasional constipation, will try daily MiraLAX 17 gr, for now and increase water and fiber in her diet. Will follow as needed basis.  3/22: Patient with GERD and epigastric pain related to heavy meals. Patient was in [place] and start having abdominal pain. Heartburn, will plan trial with PPI and Carafate. Denies diarrhea, rectal bleeding, or other health issues. Patient had upper endoscopy in 2022 with evidence of a small hiatal hernia and distal esophagitis, biopsies show no evidence of reflux negative for Monet's esophagus, negative for dysplasia.  Patient also have gastritis negative for H. pylori and normal duodenum.  With this finding we will start treatment with PPI and Carafate, will follow in 3 months. Will do ultrasound of the abdomen. : Patient had ultrasound with cystic lesion of the kidney, will defer to PCP follow up and evaluation. Pancreas no completely evaluated, no other pathology seen. Patient since last week with LLQ  similar with previous episode of diverticulitis. Today there is no abdominal pain. Will monitor her clinical course, patient feels better but persist with constipation, will start MiraLAX and will call back if symptoms recur.   : Patient with MiraLAX twice a day, drinking water, with regular BM,  denies further LLQ tenderness.  Patient doing well, will try to wean her off the PPI. Will follow as needed basis, will follow in 6 months. Patient here today in good general state.  Patient states she is finishing treatment for diverticulitis with metronidazole and ciprofloxacin 4 days ago.  States that she had a flare of her gastritis, started while she was doing this treatment.  Patient also complaining of constipation and rectal pain.  Physical exam there is a thrombosed hemorrhoid with teeny excoriation at 10:00 o'clock.  Patient will resume diet and treatment for gastritis and reflux with famotidine 20 mg at bedtime and 20 mg of omeprazole in a.m., diet.  Will avoid constipation start on Linzess 172 micrograms daily since over-the-counter laxatives are not working for her anymore.  Topical treatment for hemorrhoids and colonoscopy inn 8 weeks.  Patient colonoscopy shows evidence of a 5 mm hyperplastic polyp into the sigmoid colon, mild diverticular disease of the entire examined colon, and internal hemorrhoids.  Today she is in good general states, her next colonoscopy will be in 5 years.  Patient states still having constipation and hemorrhoids irritation Linzess is working good for her, but she is lacking of her medication.  She will increase fiber and fluid intake, exercises continue with Linzess 172 mcg daily topical treatment for hemorrhoids.   Patient here today in good general state.  Patient states having hemorrhoids irritation Linzess is giving her too much diarrhea.  We are going to decrease dose of Linzess to 72 mcg daily.  Patient will avoid constipation topical treatment for hemorrhoids increase fiber and fluid intake increase exercises.   Patient is here today in good general state. She states she is doing treatment with metronidazole 500 mg 3 times a day for the last 13 days.  This treatment was ordered a provider from her medical insurance.  She has medical history of pan diverticulitis with a diverticulitis disease event on .  Diverticulitis disease in its complication was discussed with her and over the use of antibiotic as well.  Patient denies any fever, nausea, vomiting, abdominal pain. Last colonoscopy was done in , we may repeat colonoscopy open patient clinical course.